# Patient Record
Sex: MALE | Race: WHITE | Employment: OTHER
[De-identification: names, ages, dates, MRNs, and addresses within clinical notes are randomized per-mention and may not be internally consistent; named-entity substitution may affect disease eponyms.]

---

## 2024-01-22 ENCOUNTER — APPOINTMENT (OUTPATIENT)
Facility: HOSPITAL | Age: 74
End: 2024-01-22
Payer: MEDICARE

## 2024-01-22 ENCOUNTER — HOSPITAL ENCOUNTER (EMERGENCY)
Facility: HOSPITAL | Age: 74
Discharge: HOME | End: 2024-01-22
Attending: STUDENT IN AN ORGANIZED HEALTH CARE EDUCATION/TRAINING PROGRAM
Payer: MEDICARE

## 2024-01-22 VITALS
SYSTOLIC BLOOD PRESSURE: 135 MMHG | BODY MASS INDEX: 28.72 KG/M2 | WEIGHT: 183 LBS | RESPIRATION RATE: 18 BRPM | OXYGEN SATURATION: 99 % | HEIGHT: 67 IN | DIASTOLIC BLOOD PRESSURE: 94 MMHG | TEMPERATURE: 99 F | HEART RATE: 66 BPM

## 2024-01-22 DIAGNOSIS — M54.41 ACUTE RIGHT-SIDED BACK PAIN WITH SCIATICA: Primary | ICD-10-CM

## 2024-01-22 LAB
ALBUMIN SERPL-MCNC: 3.7 G/DL (ref 3.5–5)
ALBUMIN/GLOB SERPL: 0.8 (ref 1.1–2.2)
ALP SERPL-CCNC: 86 U/L (ref 45–117)
ALT SERPL-CCNC: 16 U/L (ref 12–78)
ANION GAP SERPL CALC-SCNC: 4 MMOL/L (ref 5–15)
APPEARANCE UR: CLEAR
AST SERPL W P-5'-P-CCNC: 9 U/L (ref 15–37)
BACTERIA URNS QL MICRO: NEGATIVE /HPF
BASOPHILS # BLD: 0.1 K/UL (ref 0–0.1)
BASOPHILS NFR BLD: 1 % (ref 0–1)
BILIRUB SERPL-MCNC: 0.7 MG/DL (ref 0.2–1)
BILIRUB UR QL: NEGATIVE
BUN SERPL-MCNC: 14 MG/DL (ref 6–20)
BUN/CREAT SERPL: 10 (ref 12–20)
CA-I BLD-MCNC: 9.3 MG/DL (ref 8.5–10.1)
CHLORIDE SERPL-SCNC: 102 MMOL/L (ref 97–108)
CO2 SERPL-SCNC: 29 MMOL/L (ref 21–32)
COLOR UR: ABNORMAL
CREAT SERPL-MCNC: 1.35 MG/DL (ref 0.7–1.3)
DIFFERENTIAL METHOD BLD: ABNORMAL
EOSINOPHIL # BLD: 0.2 K/UL (ref 0–0.4)
EOSINOPHIL NFR BLD: 2 % (ref 0–7)
EPITH CASTS URNS QL MICRO: ABNORMAL /LPF
ERYTHROCYTE [DISTWIDTH] IN BLOOD BY AUTOMATED COUNT: 12 % (ref 11.5–14.5)
GLOBULIN SER CALC-MCNC: 4.9 G/DL (ref 2–4)
GLUCOSE SERPL-MCNC: 110 MG/DL (ref 65–100)
GLUCOSE UR STRIP.AUTO-MCNC: NEGATIVE MG/DL
HCT VFR BLD AUTO: 44.5 % (ref 36.6–50.3)
HGB BLD-MCNC: 16.1 G/DL (ref 12.1–17)
HGB UR QL STRIP: NEGATIVE
IMM GRANULOCYTES # BLD AUTO: 0 K/UL (ref 0–0.04)
IMM GRANULOCYTES NFR BLD AUTO: 0 % (ref 0–0.5)
KETONES UR QL STRIP.AUTO: NEGATIVE MG/DL
LEUKOCYTE ESTERASE UR QL STRIP.AUTO: NEGATIVE
LIPASE SERPL-CCNC: 38 U/L (ref 13–75)
LYMPHOCYTES # BLD: 1.5 K/UL (ref 0.8–3.5)
LYMPHOCYTES NFR BLD: 13 % (ref 12–49)
MCH RBC QN AUTO: 32.9 PG (ref 26–34)
MCHC RBC AUTO-ENTMCNC: 36.2 G/DL (ref 30–36.5)
MCV RBC AUTO: 91 FL (ref 80–99)
MONOCYTES # BLD: 0.8 K/UL (ref 0–1)
MONOCYTES NFR BLD: 6 % (ref 5–13)
MUCOUS THREADS URNS QL MICRO: ABNORMAL /LPF
NEUTS SEG # BLD: 9.5 K/UL (ref 1.8–8)
NEUTS SEG NFR BLD: 78 % (ref 32–75)
NITRITE UR QL STRIP.AUTO: NEGATIVE
NRBC # BLD: 0 K/UL (ref 0–0.01)
NRBC BLD-RTO: 0 PER 100 WBC
PH UR STRIP: 6 (ref 5–8)
PLATELET # BLD AUTO: 367 K/UL (ref 150–400)
PMV BLD AUTO: 9.5 FL (ref 8.9–12.9)
POTASSIUM SERPL-SCNC: 3.4 MMOL/L (ref 3.5–5.1)
PROT SERPL-MCNC: 8.6 G/DL (ref 6.4–8.2)
PROT UR STRIP-MCNC: 30 MG/DL
RBC # BLD AUTO: 4.89 M/UL (ref 4.1–5.7)
RBC #/AREA URNS HPF: ABNORMAL /HPF (ref 0–5)
SODIUM SERPL-SCNC: 135 MMOL/L (ref 136–145)
SP GR UR REFRACTOMETRY: 1.02 (ref 1–1.03)
UROBILINOGEN UR QL STRIP.AUTO: 0.1 EU/DL (ref 0.1–1)
WBC # BLD AUTO: 12.1 K/UL (ref 4.1–11.1)
WBC URNS QL MICRO: ABNORMAL /HPF (ref 0–4)

## 2024-01-22 PROCEDURE — 96374 THER/PROPH/DIAG INJ IV PUSH: CPT

## 2024-01-22 PROCEDURE — 6360000002 HC RX W HCPCS: Performed by: STUDENT IN AN ORGANIZED HEALTH CARE EDUCATION/TRAINING PROGRAM

## 2024-01-22 PROCEDURE — 85025 COMPLETE CBC W/AUTO DIFF WBC: CPT

## 2024-01-22 PROCEDURE — 83690 ASSAY OF LIPASE: CPT

## 2024-01-22 PROCEDURE — 74176 CT ABD & PELVIS W/O CONTRAST: CPT

## 2024-01-22 PROCEDURE — 36415 COLL VENOUS BLD VENIPUNCTURE: CPT

## 2024-01-22 PROCEDURE — 99284 EMERGENCY DEPT VISIT MOD MDM: CPT

## 2024-01-22 PROCEDURE — 6370000000 HC RX 637 (ALT 250 FOR IP): Performed by: STUDENT IN AN ORGANIZED HEALTH CARE EDUCATION/TRAINING PROGRAM

## 2024-01-22 PROCEDURE — 81001 URINALYSIS AUTO W/SCOPE: CPT

## 2024-01-22 PROCEDURE — 80053 COMPREHEN METABOLIC PANEL: CPT

## 2024-01-22 RX ORDER — OXYCODONE HYDROCHLORIDE AND ACETAMINOPHEN 5; 325 MG/1; MG/1
1 TABLET ORAL
Status: COMPLETED | OUTPATIENT
Start: 2024-01-22 | End: 2024-01-22

## 2024-01-22 RX ORDER — METHOCARBAMOL 750 MG/1
750 TABLET, FILM COATED ORAL 3 TIMES DAILY
Qty: 9 TABLET | Refills: 0 | Status: SHIPPED | OUTPATIENT
Start: 2024-01-22 | End: 2024-01-25

## 2024-01-22 RX ORDER — METHOCARBAMOL 750 MG/1
750 TABLET, FILM COATED ORAL 3 TIMES DAILY
Qty: 9 TABLET | Refills: 0 | Status: SHIPPED | OUTPATIENT
Start: 2024-01-22 | End: 2024-01-22

## 2024-01-22 RX ORDER — KETOROLAC TROMETHAMINE 30 MG/ML
15 INJECTION, SOLUTION INTRAMUSCULAR; INTRAVENOUS
Status: COMPLETED | OUTPATIENT
Start: 2024-01-22 | End: 2024-01-22

## 2024-01-22 RX ORDER — LIDOCAINE 50 MG/G
1 PATCH TOPICAL DAILY
Qty: 10 PATCH | Refills: 0 | Status: SHIPPED | OUTPATIENT
Start: 2024-01-22

## 2024-01-22 RX ORDER — NAPROXEN 500 MG/1
500 TABLET ORAL 2 TIMES DAILY
Qty: 14 TABLET | Refills: 0 | Status: SHIPPED | OUTPATIENT
Start: 2024-01-22 | End: 2024-01-22

## 2024-01-22 RX ORDER — NAPROXEN 500 MG/1
500 TABLET ORAL 2 TIMES DAILY
Qty: 14 TABLET | Refills: 0 | Status: SHIPPED | OUTPATIENT
Start: 2024-01-22 | End: 2024-01-29

## 2024-01-22 RX ORDER — LIDOCAINE 50 MG/G
1 PATCH TOPICAL DAILY
Qty: 10 PATCH | Refills: 0 | Status: SHIPPED | OUTPATIENT
Start: 2024-01-22 | End: 2024-01-22

## 2024-01-22 RX ADMIN — OXYCODONE AND ACETAMINOPHEN 1 TABLET: 5; 325 TABLET ORAL at 12:19

## 2024-01-22 RX ADMIN — KETOROLAC TROMETHAMINE 15 MG: 30 INJECTION INTRAMUSCULAR; INTRAVENOUS at 12:19

## 2024-01-22 ASSESSMENT — PAIN DESCRIPTION - LOCATION
LOCATION: FLANK
LOCATION: BACK

## 2024-01-22 ASSESSMENT — LIFESTYLE VARIABLES
HOW OFTEN DO YOU HAVE A DRINK CONTAINING ALCOHOL: NEVER
HOW MANY STANDARD DRINKS CONTAINING ALCOHOL DO YOU HAVE ON A TYPICAL DAY: PATIENT DOES NOT DRINK

## 2024-01-22 ASSESSMENT — PAIN - FUNCTIONAL ASSESSMENT
PAIN_FUNCTIONAL_ASSESSMENT: ACTIVITIES ARE NOT PREVENTED
PAIN_FUNCTIONAL_ASSESSMENT: NONE - DENIES PAIN

## 2024-01-22 ASSESSMENT — PAIN DESCRIPTION - ORIENTATION
ORIENTATION: RIGHT
ORIENTATION: RIGHT

## 2024-01-22 ASSESSMENT — PAIN SCALES - GENERAL
PAINLEVEL_OUTOF10: 8
PAINLEVEL_OUTOF10: 5

## 2024-01-22 ASSESSMENT — PAIN DESCRIPTION - DESCRIPTORS: DESCRIPTORS: ACHING

## 2024-01-22 NOTE — ED NOTES
Ambulated to bathroom with no assistance. Pt states he stuck his finger in the urine cup at the point of tube collection and c/o bleeding on finger. RN provided a band-aid. MD made aware. Pt states last Tetanus shot was Feb 2023

## 2024-01-23 NOTE — ED PROVIDER NOTES
[facility] EMERGENCY DEPT  EMERGENCY DEPARTMENT HISTORY AND PHYSICAL EXAM      Date: 1/22/2024  Patient Name: Chip Daugherty  MRN: [id number]  Birthdate 1950  Date of evaluation: 1/22/2024  Provider: Gurwinder Ricci MD   Note Started: 3:01 PM EST 1/23/24    HISTORY OF PRESENT ILLNESS     Chief Complaint   Patient presents with    Flank Pain       History Provided By: Patient    HPI: Chip Daugherty is a 73 y.o. male with past medical history as reviewed below presents for evaluation of back pain and flank pain.  Symptoms present for the last week.  Patient endorses right-sided flank pain.  No associated dysuria or hematuria.  No heavy lifting.  Over-the-counter analgesics used with minimal relief.  No focal motor weakness or loss of sensation.  Does have a history of kidney stones and is chronic left renal atrophy    PAST MEDICAL HISTORY   Past Medical History:  Past Medical History:   Diagnosis Date    Heart attack (HCC)     High cholesterol     Hypertension        Past Surgical History:  No past surgical history on file.    Family History:  No family history on file.    Social History:       Allergies:  No Known Allergies    PCP: Uvaldo Christianson APRN - NP    Current Meds:   No current facility-administered medications for this encounter.     Current Outpatient Medications   Medication Sig Dispense Refill    lidocaine (LIDODERM) 5 % Place 1 patch onto the skin daily 12 hours on, 12 hours off. 10 patch 0    methocarbamol (ROBAXIN-750) 750 MG tablet Take 1 tablet by mouth 3 times daily for 3 days 9 tablet 0    naproxen (NAPROSYN) 500 MG tablet Take 1 tablet by mouth 2 times daily for 7 days 14 tablet 0       Social Determinants of Health:   Social Determinants of Health     Tobacco Use: Not on file   Alcohol Use: Not At Risk (1/22/2024)    AUDIT-C     Frequency of Alcohol Consumption: Never     Average Number of Drinks: Patient does not drink     Frequency of Binge Drinking: Never   Financial Resource

## 2024-12-07 ENCOUNTER — APPOINTMENT (OUTPATIENT)
Facility: HOSPITAL | Age: 74
DRG: 061 | End: 2024-12-07
Payer: MEDICARE

## 2024-12-07 ENCOUNTER — HOSPITAL ENCOUNTER (INPATIENT)
Facility: HOSPITAL | Age: 74
LOS: 3 days | Discharge: NURSING, REHAB OR LONG-TERM CARE | DRG: 061 | End: 2024-12-10
Attending: EMERGENCY MEDICINE | Admitting: INTERNAL MEDICINE
Payer: MEDICARE

## 2024-12-07 DIAGNOSIS — R79.89 ELEVATED TROPONIN: ICD-10-CM

## 2024-12-07 DIAGNOSIS — I63.9 CEREBROVASCULAR ACCIDENT (CVA), UNSPECIFIED MECHANISM (HCC): Primary | ICD-10-CM

## 2024-12-07 DIAGNOSIS — M25.511 ACUTE PAIN OF RIGHT SHOULDER: ICD-10-CM

## 2024-12-07 DIAGNOSIS — K52.9 COLITIS: ICD-10-CM

## 2024-12-07 LAB
ALBUMIN SERPL-MCNC: 3.5 G/DL (ref 3.5–5)
ALBUMIN/GLOB SERPL: 0.9 (ref 1.1–2.2)
ALP SERPL-CCNC: 75 U/L (ref 45–117)
ALT SERPL-CCNC: 18 U/L (ref 12–78)
ANION GAP SERPL CALC-SCNC: 6 MMOL/L (ref 2–12)
ANION GAP SERPL CALC-SCNC: 7 MMOL/L (ref 2–12)
AST SERPL W P-5'-P-CCNC: ABNORMAL U/L (ref 15–37)
BASOPHILS # BLD: 0.1 K/UL (ref 0–0.1)
BASOPHILS NFR BLD: 1 % (ref 0–1)
BILIRUB SERPL-MCNC: 0.6 MG/DL (ref 0.2–1)
BUN SERPL-MCNC: 14 MG/DL (ref 6–20)
BUN SERPL-MCNC: 15 MG/DL (ref 6–20)
BUN/CREAT SERPL: 12 (ref 12–20)
BUN/CREAT SERPL: 12 (ref 12–20)
CA-I BLD-MCNC: 8.6 MG/DL (ref 8.5–10.1)
CA-I BLD-MCNC: 9 MG/DL (ref 8.5–10.1)
CHLORIDE SERPL-SCNC: 110 MMOL/L (ref 97–108)
CHLORIDE SERPL-SCNC: 111 MMOL/L (ref 97–108)
CO2 SERPL-SCNC: 24 MMOL/L (ref 21–32)
CO2 SERPL-SCNC: 24 MMOL/L (ref 21–32)
CREAT SERPL-MCNC: 1.17 MG/DL (ref 0.7–1.3)
CREAT SERPL-MCNC: 1.29 MG/DL (ref 0.7–1.3)
DIFFERENTIAL METHOD BLD: ABNORMAL
EOSINOPHIL # BLD: 0 K/UL (ref 0–0.4)
EOSINOPHIL NFR BLD: 0 % (ref 0–7)
ERYTHROCYTE [DISTWIDTH] IN BLOOD BY AUTOMATED COUNT: 12.2 % (ref 11.5–14.5)
GLOBULIN SER CALC-MCNC: 3.7 G/DL (ref 2–4)
GLUCOSE BLD STRIP.AUTO-MCNC: 118 MG/DL (ref 65–100)
GLUCOSE SERPL-MCNC: 114 MG/DL (ref 65–100)
GLUCOSE SERPL-MCNC: 131 MG/DL (ref 65–100)
HCT VFR BLD AUTO: 39 % (ref 36.6–50.3)
HGB BLD-MCNC: 13.9 G/DL (ref 12.1–17)
IMM GRANULOCYTES # BLD AUTO: 0.1 K/UL (ref 0–0.04)
IMM GRANULOCYTES NFR BLD AUTO: 0 % (ref 0–0.5)
INR PPP: 1 (ref 0.9–1.1)
LYMPHOCYTES # BLD: 0.9 K/UL (ref 0.8–3.5)
LYMPHOCYTES NFR BLD: 7 % (ref 12–49)
MCH RBC QN AUTO: 33.3 PG (ref 26–34)
MCHC RBC AUTO-ENTMCNC: 35.6 G/DL (ref 30–36.5)
MCV RBC AUTO: 93.3 FL (ref 80–99)
MONOCYTES # BLD: 0.4 K/UL (ref 0–1)
MONOCYTES NFR BLD: 3 % (ref 5–13)
MRSA DNA SPEC QL NAA+PROBE: NOT DETECTED
NEUTS SEG # BLD: 11 K/UL (ref 1.8–8)
NEUTS SEG NFR BLD: 89 % (ref 32–75)
NRBC # BLD: 0 K/UL (ref 0–0.01)
NRBC BLD-RTO: 0 PER 100 WBC
PERFORMED BY:: ABNORMAL
PLATELET # BLD AUTO: 338 K/UL (ref 150–400)
PMV BLD AUTO: 9.6 FL (ref 8.9–12.9)
POTASSIUM SERPL-SCNC: 3.6 MMOL/L (ref 3.5–5.1)
POTASSIUM SERPL-SCNC: ABNORMAL MMOL/L (ref 3.5–5.1)
PROT SERPL-MCNC: 7.2 G/DL (ref 6.4–8.2)
PROTHROMBIN TIME: 14 SEC (ref 11.9–14.6)
RBC # BLD AUTO: 4.18 M/UL (ref 4.1–5.7)
SODIUM SERPL-SCNC: 140 MMOL/L (ref 136–145)
SODIUM SERPL-SCNC: 142 MMOL/L (ref 136–145)
TROPONIN I SERPL HS-MCNC: 1301 NG/L (ref 0–76)
TROPONIN I SERPL HS-MCNC: 1560 NG/L (ref 0–76)
WBC # BLD AUTO: 12.5 K/UL (ref 4.1–11.1)

## 2024-12-07 PROCEDURE — 0042T CT BRAIN PERFUSION: CPT

## 2024-12-07 PROCEDURE — 70498 CT ANGIOGRAPHY NECK: CPT

## 2024-12-07 PROCEDURE — 71275 CT ANGIOGRAPHY CHEST: CPT

## 2024-12-07 PROCEDURE — 70450 CT HEAD/BRAIN W/O DYE: CPT

## 2024-12-07 PROCEDURE — 2000000000 HC ICU R&B

## 2024-12-07 PROCEDURE — 72125 CT NECK SPINE W/O DYE: CPT

## 2024-12-07 PROCEDURE — 2580000003 HC RX 258: Performed by: EMERGENCY MEDICINE

## 2024-12-07 PROCEDURE — 72131 CT LUMBAR SPINE W/O DYE: CPT

## 2024-12-07 PROCEDURE — 82962 GLUCOSE BLOOD TEST: CPT

## 2024-12-07 PROCEDURE — 6370000000 HC RX 637 (ALT 250 FOR IP): Performed by: NURSE PRACTITIONER

## 2024-12-07 PROCEDURE — 71045 X-RAY EXAM CHEST 1 VIEW: CPT

## 2024-12-07 PROCEDURE — 93005 ELECTROCARDIOGRAM TRACING: CPT | Performed by: EMERGENCY MEDICINE

## 2024-12-07 PROCEDURE — 96375 TX/PRO/DX INJ NEW DRUG ADDON: CPT

## 2024-12-07 PROCEDURE — 2580000003 HC RX 258: Performed by: INTERNAL MEDICINE

## 2024-12-07 PROCEDURE — 72128 CT CHEST SPINE W/O DYE: CPT

## 2024-12-07 PROCEDURE — 99285 EMERGENCY DEPT VISIT HI MDM: CPT

## 2024-12-07 PROCEDURE — 3E03317 INTRODUCTION OF OTHER THROMBOLYTIC INTO PERIPHERAL VEIN, PERCUTANEOUS APPROACH: ICD-10-PCS | Performed by: EMERGENCY MEDICINE

## 2024-12-07 PROCEDURE — 80048 BASIC METABOLIC PNL TOTAL CA: CPT

## 2024-12-07 PROCEDURE — 85025 COMPLETE CBC W/AUTO DIFF WBC: CPT

## 2024-12-07 PROCEDURE — 37195 THROMBOLYTIC THERAPY STROKE: CPT

## 2024-12-07 PROCEDURE — 2100000000 HC CCU R&B

## 2024-12-07 PROCEDURE — 6360000002 HC RX W HCPCS: Performed by: EMERGENCY MEDICINE

## 2024-12-07 PROCEDURE — 80053 COMPREHEN METABOLIC PANEL: CPT

## 2024-12-07 PROCEDURE — 4A03X5D MEASUREMENT OF ARTERIAL FLOW, INTRACRANIAL, EXTERNAL APPROACH: ICD-10-PCS | Performed by: RADIOLOGY

## 2024-12-07 PROCEDURE — 87040 BLOOD CULTURE FOR BACTERIA: CPT

## 2024-12-07 PROCEDURE — 84484 ASSAY OF TROPONIN QUANT: CPT

## 2024-12-07 PROCEDURE — 87641 MR-STAPH DNA AMP PROBE: CPT

## 2024-12-07 PROCEDURE — 85610 PROTHROMBIN TIME: CPT

## 2024-12-07 PROCEDURE — 96365 THER/PROPH/DIAG IV INF INIT: CPT

## 2024-12-07 PROCEDURE — 6360000004 HC RX CONTRAST MEDICATION: Performed by: EMERGENCY MEDICINE

## 2024-12-07 RX ORDER — SODIUM CHLORIDE 9 MG/ML
INJECTION, SOLUTION INTRAVENOUS PRN
Status: DISCONTINUED | OUTPATIENT
Start: 2024-12-07 | End: 2024-12-10 | Stop reason: HOSPADM

## 2024-12-07 RX ORDER — ACETAMINOPHEN 325 MG/1
650 TABLET ORAL EVERY 6 HOURS PRN
Status: DISCONTINUED | OUTPATIENT
Start: 2024-12-07 | End: 2024-12-07

## 2024-12-07 RX ORDER — ACETAMINOPHEN 650 MG/1
650 SUPPOSITORY RECTAL EVERY 6 HOURS PRN
Status: DISCONTINUED | OUTPATIENT
Start: 2024-12-07 | End: 2024-12-07

## 2024-12-07 RX ORDER — SODIUM CHLORIDE 0.9 % (FLUSH) 0.9 %
5-40 SYRINGE (ML) INJECTION PRN
Status: DISCONTINUED | OUTPATIENT
Start: 2024-12-07 | End: 2024-12-10 | Stop reason: HOSPADM

## 2024-12-07 RX ORDER — METRONIDAZOLE 500 MG/100ML
500 INJECTION, SOLUTION INTRAVENOUS
Status: COMPLETED | OUTPATIENT
Start: 2024-12-07 | End: 2024-12-07

## 2024-12-07 RX ORDER — MORPHINE SULFATE 4 MG/ML
4 INJECTION, SOLUTION INTRAMUSCULAR; INTRAVENOUS
Status: COMPLETED | OUTPATIENT
Start: 2024-12-07 | End: 2024-12-07

## 2024-12-07 RX ORDER — ONDANSETRON 2 MG/ML
4 INJECTION INTRAMUSCULAR; INTRAVENOUS EVERY 6 HOURS PRN
Status: DISCONTINUED | OUTPATIENT
Start: 2024-12-07 | End: 2024-12-10 | Stop reason: HOSPADM

## 2024-12-07 RX ORDER — LIDOCAINE 4 G/G
1 PATCH TOPICAL DAILY
Status: DISCONTINUED | OUTPATIENT
Start: 2024-12-07 | End: 2024-12-10 | Stop reason: HOSPADM

## 2024-12-07 RX ORDER — SODIUM CHLORIDE 0.9 % (FLUSH) 0.9 %
10 SYRINGE (ML) INJECTION ONCE
Status: COMPLETED | OUTPATIENT
Start: 2024-12-07 | End: 2024-12-07

## 2024-12-07 RX ORDER — TRAMADOL HYDROCHLORIDE 50 MG/1
50 TABLET ORAL EVERY 6 HOURS PRN
Status: ON HOLD | COMMUNITY
End: 2024-12-10

## 2024-12-07 RX ORDER — SODIUM CHLORIDE 0.9 % (FLUSH) 0.9 %
5-40 SYRINGE (ML) INJECTION EVERY 12 HOURS SCHEDULED
Status: DISCONTINUED | OUTPATIENT
Start: 2024-12-07 | End: 2024-12-10 | Stop reason: HOSPADM

## 2024-12-07 RX ORDER — POTASSIUM CHLORIDE 7.45 MG/ML
10 INJECTION INTRAVENOUS PRN
Status: DISCONTINUED | OUTPATIENT
Start: 2024-12-07 | End: 2024-12-10 | Stop reason: HOSPADM

## 2024-12-07 RX ORDER — TELMISARTAN 20 MG/1
20 TABLET ORAL DAILY
COMMUNITY

## 2024-12-07 RX ORDER — ACETAMINOPHEN 650 MG/1
650 SUPPOSITORY RECTAL EVERY 6 HOURS SCHEDULED
Status: DISCONTINUED | OUTPATIENT
Start: 2024-12-07 | End: 2024-12-10 | Stop reason: HOSPADM

## 2024-12-07 RX ORDER — HYDROCHLOROTHIAZIDE 12.5 MG/1
12.5 CAPSULE ORAL DAILY
COMMUNITY

## 2024-12-07 RX ORDER — CLOPIDOGREL BISULFATE 75 MG/1
75 TABLET ORAL DAILY
COMMUNITY

## 2024-12-07 RX ORDER — DIPHENHYDRAMINE HCL 25 MG
25 TABLET ORAL EVERY 6 HOURS PRN
Status: ON HOLD | COMMUNITY
End: 2024-12-10 | Stop reason: HOSPADM

## 2024-12-07 RX ORDER — IOPAMIDOL 755 MG/ML
100 INJECTION, SOLUTION INTRAVASCULAR
Status: COMPLETED | OUTPATIENT
Start: 2024-12-07 | End: 2024-12-07

## 2024-12-07 RX ORDER — TRAZODONE HYDROCHLORIDE 50 MG/1
50 TABLET, FILM COATED ORAL NIGHTLY
COMMUNITY

## 2024-12-07 RX ORDER — POTASSIUM CHLORIDE 29.8 MG/ML
20 INJECTION INTRAVENOUS PRN
Status: DISCONTINUED | OUTPATIENT
Start: 2024-12-07 | End: 2024-12-10 | Stop reason: HOSPADM

## 2024-12-07 RX ORDER — MAGNESIUM SULFATE IN WATER 40 MG/ML
2000 INJECTION, SOLUTION INTRAVENOUS PRN
Status: DISCONTINUED | OUTPATIENT
Start: 2024-12-07 | End: 2024-12-10 | Stop reason: HOSPADM

## 2024-12-07 RX ORDER — METOPROLOL SUCCINATE 25 MG/1
25 TABLET, EXTENDED RELEASE ORAL DAILY
COMMUNITY

## 2024-12-07 RX ORDER — ROSUVASTATIN CALCIUM 10 MG/1
10 TABLET, COATED ORAL DAILY
Status: ON HOLD | COMMUNITY
End: 2024-12-10

## 2024-12-07 RX ORDER — ENOXAPARIN SODIUM 100 MG/ML
40 INJECTION SUBCUTANEOUS DAILY
Status: DISCONTINUED | OUTPATIENT
Start: 2024-12-07 | End: 2024-12-07

## 2024-12-07 RX ORDER — ACETAMINOPHEN 325 MG/1
650 TABLET ORAL EVERY 6 HOURS SCHEDULED
Status: DISCONTINUED | OUTPATIENT
Start: 2024-12-07 | End: 2024-12-10 | Stop reason: HOSPADM

## 2024-12-07 RX ADMIN — TENECTEPLASE 21 MG: KIT at 12:48

## 2024-12-07 RX ADMIN — Medication 10 ML: at 12:48

## 2024-12-07 RX ADMIN — ACETAMINOPHEN 650 MG: 650 SUPPOSITORY RECTAL at 20:48

## 2024-12-07 RX ADMIN — SODIUM CHLORIDE, PRESERVATIVE FREE 10 ML: 5 INJECTION INTRAVENOUS at 20:55

## 2024-12-07 RX ADMIN — SODIUM CHLORIDE, PRESERVATIVE FREE 10 ML: 5 INJECTION INTRAVENOUS at 20:59

## 2024-12-07 RX ADMIN — PIPERACILLIN AND TAZOBACTAM 4500 MG: 4; .5 INJECTION, POWDER, FOR SOLUTION INTRAVENOUS at 15:27

## 2024-12-07 RX ADMIN — SODIUM CHLORIDE, PRESERVATIVE FREE 10 ML: 5 INJECTION INTRAVENOUS at 20:50

## 2024-12-07 RX ADMIN — IOPAMIDOL 100 ML: 755 INJECTION, SOLUTION INTRAVENOUS at 14:27

## 2024-12-07 RX ADMIN — IOPAMIDOL 100 ML: 755 INJECTION, SOLUTION INTRAVENOUS at 12:13

## 2024-12-07 RX ADMIN — METRONIDAZOLE 500 MG: 500 INJECTION, SOLUTION INTRAVENOUS at 16:42

## 2024-12-07 RX ADMIN — MORPHINE SULFATE 4 MG: 4 INJECTION, SOLUTION INTRAMUSCULAR; INTRAVENOUS at 13:24

## 2024-12-07 ASSESSMENT — PAIN SCALES - GENERAL
PAINLEVEL_OUTOF10: 0
PAINLEVEL_OUTOF10: 8
PAINLEVEL_OUTOF10: 0

## 2024-12-07 ASSESSMENT — PAIN - FUNCTIONAL ASSESSMENT
PAIN_FUNCTIONAL_ASSESSMENT: NONE - DENIES PAIN

## 2024-12-07 ASSESSMENT — PAIN DESCRIPTION - LOCATION: LOCATION: LEG

## 2024-12-07 ASSESSMENT — LIFESTYLE VARIABLES
HOW MANY STANDARD DRINKS CONTAINING ALCOHOL DO YOU HAVE ON A TYPICAL DAY: PATIENT DOES NOT DRINK
HOW OFTEN DO YOU HAVE A DRINK CONTAINING ALCOHOL: NEVER

## 2024-12-07 NOTE — ED NOTES
Spastic movements keep occurring in right leg, remains stiff at this time, has some movement occasionally.

## 2024-12-07 NOTE — ED TRIAGE NOTES
Rt sided weakness, wife saw him last night normal. Patient stated when he got us this AM sun was up, unknown time, he went into bathroom when his legs gave out on him. Right arm deficit present. Bilateral leg stiffness, bilateral leg weakness,  right sided facial drop, slurred speech for ems  but upon ED arrival patient is mute, delayed physical responses.     Per family LKWT possible around 0900am but still remains unknown.       Glucose 174    Concern for spinal injury after fall, ccollar placed, additional scans placed.     TNK delayed due to UNKNOWN LKWT.

## 2024-12-07 NOTE — ED PROVIDER NOTES
[facility] [place]PT  EMERGENCY DEPARTMENT HISTORY AND PHYSICAL EXAM      Date: 12/7/2024  Patient Name: Chip Daugherty  MRN: [id number]  Birthdate 1950  Date of evaluation: 12/7/2024  Provider: Fanny Kent MD   Note Started: 3:07 PM EST 12/7/24    HISTORY OF PRESENT ILLNESS     Chief Complaint   Patient presents with    Cerebrovascular Accident       History Provided By: Patient    HPI: Chip Daugherty is a 74 y.o. male patient presents as a level 1 stroke alert last known well was 2 hours ago when he walked to the bathroom then developed dysarthria right arm weakness and lower extremity weakness.  Had a controlled fall to the ground.  Not traumatic no head injury.    PAST MEDICAL HISTORY   Past Medical History:  Past Medical History:   Diagnosis Date    Heart attack (HCC)     High cholesterol     Hypertension        Past Surgical History:  History reviewed. No pertinent surgical history.    Family History:  History reviewed. No pertinent family history.    Social History:       Allergies:  No Known Allergies    PCP: Uvaldo Christianson APRN - NP    Current Meds:   Current Facility-Administered Medications   Medication Dose Route Frequency Provider Last Rate Last Admin    TENECTEplase (TNKASE) 50 MG injection             sodium chloride flush 0.9 % injection 5-40 mL  5-40 mL IntraVENous 2 times per day Fanny Kent MD        sodium chloride flush 0.9 % injection 5-40 mL  5-40 mL IntraVENous PRN Fanny Kent MD        0.9 % sodium chloride infusion   IntraVENous PRN Fanny Kent MD        dextrose bolus 10% 125 mL  125 mL IntraVENous PRN Fanny Kent MD        ondansetron (ZOFRAN) injection 4 mg  4 mg IntraVENous Q6H PRN Fanny Kent MD        piperacillin-tazobactam (ZOSYN) 4,500 mg in sodium chloride 0.9 % 100 mL IVPB (mini-bag)  4,500 mg IntraVENous NOW Fanny Kent MD        metroNIDAZOLE (FLAGYL) 500 mg in 0.9% NaCl 100 mL IVPB premix  500 mg IntraVENous NOW

## 2024-12-08 ENCOUNTER — APPOINTMENT (OUTPATIENT)
Facility: HOSPITAL | Age: 74
DRG: 061 | End: 2024-12-08
Payer: MEDICARE

## 2024-12-08 ENCOUNTER — APPOINTMENT (OUTPATIENT)
Facility: HOSPITAL | Age: 74
DRG: 061 | End: 2024-12-08
Attending: INTERNAL MEDICINE
Payer: MEDICARE

## 2024-12-08 LAB
ANION GAP SERPL CALC-SCNC: 8 MMOL/L (ref 2–12)
BUN SERPL-MCNC: 14 MG/DL (ref 6–20)
BUN/CREAT SERPL: 13 (ref 12–20)
CA-I BLD-MCNC: 8.9 MG/DL (ref 8.5–10.1)
CHLORIDE SERPL-SCNC: 112 MMOL/L (ref 97–108)
CHOLEST SERPL-MCNC: 130 MG/DL
CO2 SERPL-SCNC: 23 MMOL/L (ref 21–32)
CREAT SERPL-MCNC: 1.11 MG/DL (ref 0.7–1.3)
EKG ATRIAL RATE: 95 BPM
EKG DIAGNOSIS: NORMAL
EKG P AXIS: 50 DEGREES
EKG P-R INTERVAL: 198 MS
EKG Q-T INTERVAL: 394 MS
EKG QRS DURATION: 104 MS
EKG QTC CALCULATION (BAZETT): 495 MS
EKG R AXIS: 0 DEGREES
EKG T AXIS: 38 DEGREES
EKG VENTRICULAR RATE: 95 BPM
ERYTHROCYTE [DISTWIDTH] IN BLOOD BY AUTOMATED COUNT: 12.5 % (ref 11.5–14.5)
EST. AVERAGE GLUCOSE BLD GHB EST-MCNC: 111 MG/DL
GLUCOSE BLD STRIP.AUTO-MCNC: 104 MG/DL (ref 65–100)
GLUCOSE SERPL-MCNC: 109 MG/DL (ref 65–100)
HBA1C MFR BLD: 5.5 % (ref 4–5.6)
HCT VFR BLD AUTO: 39.1 % (ref 36.6–50.3)
HDLC SERPL-MCNC: 38 MG/DL
HDLC SERPL: 3.4 (ref 0–5)
HGB BLD-MCNC: 13.6 G/DL (ref 12.1–17)
LDLC SERPL CALC-MCNC: 74.2 MG/DL (ref 0–100)
LIPID PANEL: NORMAL
MCH RBC QN AUTO: 32.7 PG (ref 26–34)
MCHC RBC AUTO-ENTMCNC: 34.8 G/DL (ref 30–36.5)
MCV RBC AUTO: 94 FL (ref 80–99)
NRBC # BLD: 0 K/UL (ref 0–0.01)
NRBC BLD-RTO: 0 PER 100 WBC
PERFORMED BY:: ABNORMAL
PLATELET # BLD AUTO: 349 K/UL (ref 150–400)
PMV BLD AUTO: 9.8 FL (ref 8.9–12.9)
POTASSIUM SERPL-SCNC: 3.7 MMOL/L (ref 3.5–5.1)
RBC # BLD AUTO: 4.16 M/UL (ref 4.1–5.7)
SODIUM SERPL-SCNC: 143 MMOL/L (ref 136–145)
TRIGL SERPL-MCNC: 89 MG/DL
VLDLC SERPL CALC-MCNC: 17.8 MG/DL
WBC # BLD AUTO: 12.7 K/UL (ref 4.1–11.1)

## 2024-12-08 PROCEDURE — 2100000000 HC CCU R&B

## 2024-12-08 PROCEDURE — 70450 CT HEAD/BRAIN W/O DYE: CPT

## 2024-12-08 PROCEDURE — 99223 1ST HOSP IP/OBS HIGH 75: CPT | Performed by: PSYCHIATRY & NEUROLOGY

## 2024-12-08 PROCEDURE — 36415 COLL VENOUS BLD VENIPUNCTURE: CPT

## 2024-12-08 PROCEDURE — 83036 HEMOGLOBIN GLYCOSYLATED A1C: CPT

## 2024-12-08 PROCEDURE — 92610 EVALUATE SWALLOWING FUNCTION: CPT

## 2024-12-08 PROCEDURE — 2580000003 HC RX 258: Performed by: INTERNAL MEDICINE

## 2024-12-08 PROCEDURE — 2580000003 HC RX 258: Performed by: EMERGENCY MEDICINE

## 2024-12-08 PROCEDURE — 80061 LIPID PANEL: CPT

## 2024-12-08 PROCEDURE — 70551 MRI BRAIN STEM W/O DYE: CPT

## 2024-12-08 PROCEDURE — 93308 TTE F-UP OR LMTD: CPT

## 2024-12-08 PROCEDURE — 82962 GLUCOSE BLOOD TEST: CPT

## 2024-12-08 PROCEDURE — 80048 BASIC METABOLIC PNL TOTAL CA: CPT

## 2024-12-08 PROCEDURE — 2000000000 HC ICU R&B

## 2024-12-08 PROCEDURE — 85027 COMPLETE CBC AUTOMATED: CPT

## 2024-12-08 PROCEDURE — 6370000000 HC RX 637 (ALT 250 FOR IP): Performed by: NURSE PRACTITIONER

## 2024-12-08 RX ORDER — TRAZODONE HYDROCHLORIDE 50 MG/1
50 TABLET, FILM COATED ORAL NIGHTLY
Status: DISCONTINUED | OUTPATIENT
Start: 2024-12-08 | End: 2024-12-10 | Stop reason: HOSPADM

## 2024-12-08 RX ORDER — TRAMADOL HYDROCHLORIDE 50 MG/1
50 TABLET ORAL EVERY 6 HOURS PRN
Status: DISCONTINUED | OUTPATIENT
Start: 2024-12-08 | End: 2024-12-10 | Stop reason: HOSPADM

## 2024-12-08 RX ORDER — ENOXAPARIN SODIUM 100 MG/ML
40 INJECTION SUBCUTANEOUS DAILY
Status: DISCONTINUED | OUTPATIENT
Start: 2024-12-09 | End: 2024-12-10 | Stop reason: HOSPADM

## 2024-12-08 RX ORDER — ATORVASTATIN CALCIUM 20 MG/1
10 TABLET, FILM COATED ORAL NIGHTLY
Status: DISCONTINUED | OUTPATIENT
Start: 2024-12-08 | End: 2024-12-10 | Stop reason: HOSPADM

## 2024-12-08 RX ORDER — CLOPIDOGREL BISULFATE 75 MG/1
75 TABLET ORAL DAILY
Status: DISCONTINUED | OUTPATIENT
Start: 2024-12-09 | End: 2024-12-10 | Stop reason: HOSPADM

## 2024-12-08 RX ORDER — BISACODYL 10 MG
10 SUPPOSITORY, RECTAL RECTAL DAILY PRN
Status: DISCONTINUED | OUTPATIENT
Start: 2024-12-08 | End: 2024-12-10 | Stop reason: HOSPADM

## 2024-12-08 RX ADMIN — TRAZODONE HYDROCHLORIDE 50 MG: 50 TABLET ORAL at 21:16

## 2024-12-08 RX ADMIN — ACETAMINOPHEN 650 MG: 325 TABLET ORAL at 14:33

## 2024-12-08 RX ADMIN — SODIUM CHLORIDE, PRESERVATIVE FREE 10 ML: 5 INJECTION INTRAVENOUS at 20:01

## 2024-12-08 RX ADMIN — ACETAMINOPHEN 650 MG: 325 TABLET ORAL at 05:42

## 2024-12-08 RX ADMIN — TRAMADOL HYDROCHLORIDE 50 MG: 50 TABLET, COATED ORAL at 21:16

## 2024-12-08 RX ADMIN — SODIUM CHLORIDE, PRESERVATIVE FREE 10 ML: 5 INJECTION INTRAVENOUS at 09:31

## 2024-12-08 RX ADMIN — ACETAMINOPHEN 650 MG: 325 TABLET ORAL at 18:36

## 2024-12-08 RX ADMIN — SODIUM CHLORIDE, PRESERVATIVE FREE 10 ML: 5 INJECTION INTRAVENOUS at 20:02

## 2024-12-08 ASSESSMENT — PAIN SCALES - GENERAL
PAINLEVEL_OUTOF10: 0
PAINLEVEL_OUTOF10: 0
PAINLEVEL_OUTOF10: 6
PAINLEVEL_OUTOF10: 0
PAINLEVEL_OUTOF10: 6
PAINLEVEL_OUTOF10: 6
PAINLEVEL_OUTOF10: 0

## 2024-12-08 ASSESSMENT — PAIN - FUNCTIONAL ASSESSMENT
PAIN_FUNCTIONAL_ASSESSMENT: PREVENTS OR INTERFERES WITH MANY ACTIVE NOT PASSIVE ACTIVITIES
PAIN_FUNCTIONAL_ASSESSMENT: PREVENTS OR INTERFERES WITH MANY ACTIVE NOT PASSIVE ACTIVITIES
PAIN_FUNCTIONAL_ASSESSMENT: PREVENTS OR INTERFERES SOME ACTIVE ACTIVITIES AND ADLS

## 2024-12-08 ASSESSMENT — PAIN DESCRIPTION - LOCATION
LOCATION: SHOULDER

## 2024-12-08 ASSESSMENT — PAIN DESCRIPTION - ORIENTATION
ORIENTATION: RIGHT

## 2024-12-08 ASSESSMENT — PAIN DESCRIPTION - DESCRIPTORS
DESCRIPTORS: ACHING
DESCRIPTORS: ACHING;THROBBING
DESCRIPTORS: ACHING;THROBBING

## 2024-12-08 ASSESSMENT — PAIN DESCRIPTION - PAIN TYPE: TYPE: OTHER (COMMENT)

## 2024-12-08 NOTE — CARE COORDINATION
12/08/24 1602   Service Assessment   Patient Orientation Alert and Oriented   Cognition Alert   History Provided By Patient;Spouse   Primary Caregiver Self   Support Systems Spouse/Significant Other   Patient's Healthcare Decision Maker is: Legal Next of Kin   PCP Verified by CM Yes   Last Visit to PCP Within last 3 months   Prior Functional Level Independent in ADLs/IADLs   Current Functional Level Independent in ADLs/IADLs   Can patient return to prior living arrangement Yes   Ability to make needs known: Good   Family able to assist with home care needs: Yes   Would you like for me to discuss the discharge plan with any other family members/significant others, and if so, who? Yes   Financial Resources Medicare;Other (Comment)  ([place])   Community Resources None   Social/Functional History   Lives With Spouse   Type of Home House   Home Equipment None   Receives Help From Family   Prior Level of Assist for ADLs Independent   Prior Level of Assist for Homemaking Independent   Occupation Retired     Demographics confirmed by the spouse.  Last seen at listed PCP x 1 month ago. Follows w/Dr. Trevino (cardiology) on an outpatient basis. Utilizes [facility] ([place]).  Prior to inpatient admission, spouse reports patient was independent w/all ADL's & IADL's.  No home O2 or DME.  No recent falls per the spouse.  Discharge disposition TBD after therapy eval and recommendation.        Advance Care Planning     General Advance Care Planning (ACP) Conversation    Date of Conversation: 12/8/2024  Conducted with: Patient with Decision Making Capacity  Other persons present: Spouse      Healthcare Decision Maker: No healthcare decision makers have been documented.       Content/Action Overview:  Reviewed DNR/DNI and patient elects Full Code (Attempt Resuscitation)        Length of Voluntary ACP Conversation in minutes:  <16 minutes (Non-Billable)           YARELY Valle

## 2024-12-08 NOTE — CONSULTS
[facility] NEUROLOGY CONSULTATION          Chief Complaint/Admission Diagnosis: Colitis [K52.9]  Elevated troponin [R79.89]  Stroke determined by clinical assessment ([facility]) [I63.9]  Cerebrovascular accident (CVA), unspecified mechanism (HCC) [I63.9]     I have been asked to see this 74 y.o. male in neurological consultation by Gordon Ruiz  to render advice and opinion regarding stroke.     ?     Impression/Recommendations:   Chip Daugherty is a 74 year old man with h/o MI in 2013 s/p stent, HTN, hyperlipidemia who was found in the bathroom floor by his wife unable to speak or move right arm and leg. He was evaluated by the teleneurologist in the ED and diagnosed with an acute ischemic stroke. He received TNK at ~1PM.   Exam shows decreased finger tapping in the right hand and right leg monoplegia. No sensory deficits noted on either side. CT head is negative for any acute change. CTA neck did not show any stenosis in the bilateral carotid arteries. EKG shows NSR      DAPT for 30 days (Aspirin 325 mg qd plus Plavix 75 mg qd) then switch to Plavix 75 mg qd after 24 hours after TNK.  Atorvastatin 80 mg qd after 24 hours of TNK.  Q4H neuro checks.  Cardiac telemetry  Permissive hypertension. Don't treat HTN until SBP more than 180.  PT/OT recommended  NPO until see by bedside swallowing or SLP evaluation.  MRI brain stroke protocol pending  Echocardiogram complete with bubble study.          Molly Weinstein MD  Teleneurologist    HPI:   History was obtained from a review of the electronic record and from the patient and family.??   Chip Daugherty is a 74 year old man with h/o MI in 2013 s/p stent, HTN, hyperlipidemia who was found on the ground by his wife unable to speak or move R extremities. He was evaluated by the teleneurologist in the ED and diagnosed with an acute ischemic stroke. He received TNK at ~1PM. There was transient improvement in his aphasia but then regression. Repeat CT

## 2024-12-08 NOTE — H&P
ill patient's bedside actively involved in patient care as well as the coordination of care.  This does not include any procedural time which has been billed separately.    Heron Novak MD  Critical Care Medicine  [facility] [place]

## 2024-12-09 ENCOUNTER — TELEPHONE (OUTPATIENT)
Facility: HOSPITAL | Age: 74
End: 2024-12-09

## 2024-12-09 LAB
ANION GAP SERPL CALC-SCNC: 8 MMOL/L (ref 2–12)
BUN SERPL-MCNC: 14 MG/DL (ref 6–20)
BUN/CREAT SERPL: 14 (ref 12–20)
CA-I BLD-MCNC: 8.5 MG/DL (ref 8.5–10.1)
CHLORIDE SERPL-SCNC: 109 MMOL/L (ref 97–108)
CO2 SERPL-SCNC: 24 MMOL/L (ref 21–32)
CREAT SERPL-MCNC: 1 MG/DL (ref 0.7–1.3)
ECHO AO ROOT DIAM: 3.2 CM
ECHO AO ROOT INDEX: 1.61 CM/M2
ECHO BSA: 2.03 M2
ECHO LA DIAMETER INDEX: 1.86 CM/M2
ECHO LA DIAMETER: 3.7 CM
ECHO LA TO AORTIC ROOT RATIO: 1.16
ECHO LV EDV A4C: 120 ML
ECHO LV EDV INDEX A4C: 60 ML/M2
ECHO LV EF PHYSICIAN: 55 %
ECHO LV EJECTION FRACTION A4C: 58 %
ECHO LV ESV A4C: 51 ML
ECHO LV ESV INDEX A4C: 26 ML/M2
ECHO LV FRACTIONAL SHORTENING: 35 % (ref 28–44)
ECHO LV INTERNAL DIMENSION DIASTOLE INDEX: 2.61 CM/M2
ECHO LV INTERNAL DIMENSION DIASTOLIC: 5.2 CM (ref 4.2–5.9)
ECHO LV INTERNAL DIMENSION SYSTOLIC INDEX: 1.71 CM/M2
ECHO LV INTERNAL DIMENSION SYSTOLIC: 3.4 CM
ECHO LV IVSD: 0.9 CM (ref 0.6–1)
ECHO LV MASS 2D: 169 G (ref 88–224)
ECHO LV MASS INDEX 2D: 84.9 G/M2 (ref 49–115)
ECHO LV POSTERIOR WALL DIASTOLIC: 0.9 CM (ref 0.6–1)
ECHO LV RELATIVE WALL THICKNESS RATIO: 0.35
ECHO LVOT AREA: 2.8 CM2
ECHO LVOT DIAM: 1.9 CM
ERYTHROCYTE [DISTWIDTH] IN BLOOD BY AUTOMATED COUNT: 12 % (ref 11.5–14.5)
GLUCOSE BLD STRIP.AUTO-MCNC: 135 MG/DL (ref 65–100)
GLUCOSE BLD STRIP.AUTO-MCNC: 185 MG/DL (ref 65–100)
GLUCOSE SERPL-MCNC: 111 MG/DL (ref 65–100)
HCT VFR BLD AUTO: 33.9 % (ref 36.6–50.3)
HGB BLD-MCNC: 11.7 G/DL (ref 12.1–17)
MCH RBC QN AUTO: 32.5 PG (ref 26–34)
MCHC RBC AUTO-ENTMCNC: 34.5 G/DL (ref 30–36.5)
MCV RBC AUTO: 94.2 FL (ref 80–99)
NRBC # BLD: 0 K/UL (ref 0–0.01)
NRBC BLD-RTO: 0 PER 100 WBC
PERFORMED BY:: ABNORMAL
PERFORMED BY:: ABNORMAL
PLATELET # BLD AUTO: 290 K/UL (ref 150–400)
PMV BLD AUTO: 9.7 FL (ref 8.9–12.9)
POTASSIUM SERPL-SCNC: 3.6 MMOL/L (ref 3.5–5.1)
RBC # BLD AUTO: 3.6 M/UL (ref 4.1–5.7)
SODIUM SERPL-SCNC: 141 MMOL/L (ref 136–145)
WBC # BLD AUTO: 10.6 K/UL (ref 4.1–11.1)

## 2024-12-09 PROCEDURE — 6370000000 HC RX 637 (ALT 250 FOR IP): Performed by: PSYCHIATRY & NEUROLOGY

## 2024-12-09 PROCEDURE — 97161 PT EVAL LOW COMPLEX 20 MIN: CPT

## 2024-12-09 PROCEDURE — 85027 COMPLETE CBC AUTOMATED: CPT

## 2024-12-09 PROCEDURE — 97530 THERAPEUTIC ACTIVITIES: CPT

## 2024-12-09 PROCEDURE — 6370000000 HC RX 637 (ALT 250 FOR IP): Performed by: NURSE PRACTITIONER

## 2024-12-09 PROCEDURE — 2580000003 HC RX 258: Performed by: EMERGENCY MEDICINE

## 2024-12-09 PROCEDURE — 6370000000 HC RX 637 (ALT 250 FOR IP): Performed by: INTERNAL MEDICINE

## 2024-12-09 PROCEDURE — 82962 GLUCOSE BLOOD TEST: CPT

## 2024-12-09 PROCEDURE — 2580000003 HC RX 258: Performed by: INTERNAL MEDICINE

## 2024-12-09 PROCEDURE — 36415 COLL VENOUS BLD VENIPUNCTURE: CPT

## 2024-12-09 PROCEDURE — 97165 OT EVAL LOW COMPLEX 30 MIN: CPT

## 2024-12-09 PROCEDURE — 6360000002 HC RX W HCPCS: Performed by: INTERNAL MEDICINE

## 2024-12-09 PROCEDURE — 80048 BASIC METABOLIC PNL TOTAL CA: CPT

## 2024-12-09 PROCEDURE — 99233 SBSQ HOSP IP/OBS HIGH 50: CPT | Performed by: PSYCHIATRY & NEUROLOGY

## 2024-12-09 PROCEDURE — 1100000000 HC RM PRIVATE

## 2024-12-09 RX ORDER — BACLOFEN 5 MG/1
5 TABLET ORAL 3 TIMES DAILY
Status: DISCONTINUED | OUTPATIENT
Start: 2024-12-09 | End: 2024-12-10 | Stop reason: HOSPADM

## 2024-12-09 RX ORDER — ASPIRIN 81 MG/1
81 TABLET, CHEWABLE ORAL DAILY
Status: DISCONTINUED | OUTPATIENT
Start: 2024-12-09 | End: 2024-12-10 | Stop reason: HOSPADM

## 2024-12-09 RX ADMIN — ACETAMINOPHEN 650 MG: 325 TABLET ORAL at 18:13

## 2024-12-09 RX ADMIN — SODIUM CHLORIDE, PRESERVATIVE FREE 10 ML: 5 INJECTION INTRAVENOUS at 22:10

## 2024-12-09 RX ADMIN — TRAMADOL HYDROCHLORIDE 50 MG: 50 TABLET, COATED ORAL at 04:56

## 2024-12-09 RX ADMIN — ATORVASTATIN CALCIUM 10 MG: 20 TABLET, FILM COATED ORAL at 22:06

## 2024-12-09 RX ADMIN — CLOPIDOGREL BISULFATE 75 MG: 75 TABLET ORAL at 09:05

## 2024-12-09 RX ADMIN — BACLOFEN 5 MG: 5 TABLET ORAL at 14:37

## 2024-12-09 RX ADMIN — TRAMADOL HYDROCHLORIDE 50 MG: 50 TABLET, COATED ORAL at 22:06

## 2024-12-09 RX ADMIN — SODIUM CHLORIDE, PRESERVATIVE FREE 10 ML: 5 INJECTION INTRAVENOUS at 08:10

## 2024-12-09 RX ADMIN — SODIUM CHLORIDE, PRESERVATIVE FREE 10 ML: 5 INJECTION INTRAVENOUS at 08:11

## 2024-12-09 RX ADMIN — ATORVASTATIN CALCIUM 10 MG: 20 TABLET, FILM COATED ORAL at 04:55

## 2024-12-09 RX ADMIN — ACETAMINOPHEN 650 MG: 325 TABLET ORAL at 14:37

## 2024-12-09 RX ADMIN — ENOXAPARIN SODIUM 40 MG: 100 INJECTION SUBCUTANEOUS at 09:06

## 2024-12-09 RX ADMIN — TRAZODONE HYDROCHLORIDE 50 MG: 50 TABLET ORAL at 22:06

## 2024-12-09 RX ADMIN — BACLOFEN 5 MG: 5 TABLET ORAL at 22:06

## 2024-12-09 RX ADMIN — ASPIRIN 81 MG: 81 TABLET, CHEWABLE ORAL at 09:05

## 2024-12-09 ASSESSMENT — PAIN SCALES - GENERAL
PAINLEVEL_OUTOF10: 7
PAINLEVEL_OUTOF10: 0
PAINLEVEL_OUTOF10: 0
PAINLEVEL_OUTOF10: 4
PAINLEVEL_OUTOF10: 0
PAINLEVEL_OUTOF10: 4
PAINLEVEL_OUTOF10: 0
PAINLEVEL_OUTOF10: 0
PAINLEVEL_OUTOF10: 6

## 2024-12-09 ASSESSMENT — PAIN DESCRIPTION - DESCRIPTORS
DESCRIPTORS: ACHING
DESCRIPTORS: ACHING
DESCRIPTORS: ACHING;THROBBING

## 2024-12-09 ASSESSMENT — PAIN DESCRIPTION - LOCATION
LOCATION: GENERALIZED
LOCATION: ABDOMEN;SHOULDER
LOCATION: SHOULDER

## 2024-12-09 ASSESSMENT — PAIN - FUNCTIONAL ASSESSMENT
PAIN_FUNCTIONAL_ASSESSMENT: PREVENTS OR INTERFERES WITH MANY ACTIVE NOT PASSIVE ACTIVITIES
PAIN_FUNCTIONAL_ASSESSMENT: PREVENTS OR INTERFERES SOME ACTIVE ACTIVITIES AND ADLS
PAIN_FUNCTIONAL_ASSESSMENT: PREVENTS OR INTERFERES SOME ACTIVE ACTIVITIES AND ADLS

## 2024-12-09 ASSESSMENT — PAIN DESCRIPTION - ORIENTATION
ORIENTATION: RIGHT
ORIENTATION: RIGHT;LEFT;MID;POSTERIOR
ORIENTATION: LOWER

## 2024-12-09 ASSESSMENT — PAIN SCALES - WONG BAKER: WONGBAKER_NUMERICALRESPONSE: NO HURT

## 2024-12-09 NOTE — TELEPHONE ENCOUNTER
Pt needs a hospital follow up appointment   Provider: Clinic Neurologist  In person   When: within 4-6 weeks  Diagnosis/reason for follow up:  Stroke    Molly Weinstein MD  Teleneurologist

## 2024-12-09 NOTE — CARE COORDINATION
CM reviewed Pt medicals, Pt lives with his wife and was IND with ADL.     Will need PT/OT eval/recommendations.     Per IDR  Pt will transfer to the floor.

## 2024-12-10 VITALS
BODY MASS INDEX: 30.07 KG/M2 | RESPIRATION RATE: 17 BRPM | DIASTOLIC BLOOD PRESSURE: 100 MMHG | SYSTOLIC BLOOD PRESSURE: 127 MMHG | HEIGHT: 67 IN | TEMPERATURE: 99 F | WEIGHT: 191.58 LBS | OXYGEN SATURATION: 96 % | HEART RATE: 109 BPM

## 2024-12-10 LAB
ANION GAP SERPL CALC-SCNC: 5 MMOL/L (ref 2–12)
BUN SERPL-MCNC: 12 MG/DL (ref 6–20)
BUN/CREAT SERPL: 12 (ref 12–20)
CA-I BLD-MCNC: 9.2 MG/DL (ref 8.5–10.1)
CHLORIDE SERPL-SCNC: 108 MMOL/L (ref 97–108)
CO2 SERPL-SCNC: 24 MMOL/L (ref 21–32)
CREAT SERPL-MCNC: 1 MG/DL (ref 0.7–1.3)
ERYTHROCYTE [DISTWIDTH] IN BLOOD BY AUTOMATED COUNT: 12 % (ref 11.5–14.5)
GLUCOSE BLD STRIP.AUTO-MCNC: 112 MG/DL (ref 65–100)
GLUCOSE SERPL-MCNC: 115 MG/DL (ref 65–100)
HCT VFR BLD AUTO: 41.7 % (ref 36.6–50.3)
HGB BLD-MCNC: 14.9 G/DL (ref 12.1–17)
MCH RBC QN AUTO: 32.8 PG (ref 26–34)
MCHC RBC AUTO-ENTMCNC: 35.7 G/DL (ref 30–36.5)
MCV RBC AUTO: 91.9 FL (ref 80–99)
NRBC # BLD: 0 K/UL (ref 0–0.01)
NRBC BLD-RTO: 0 PER 100 WBC
PERFORMED BY:: ABNORMAL
PLATELET # BLD AUTO: 368 K/UL (ref 150–400)
PMV BLD AUTO: 9.8 FL (ref 8.9–12.9)
POTASSIUM SERPL-SCNC: 3.4 MMOL/L (ref 3.5–5.1)
RBC # BLD AUTO: 4.54 M/UL (ref 4.1–5.7)
SODIUM SERPL-SCNC: 137 MMOL/L (ref 136–145)
WBC # BLD AUTO: 12.6 K/UL (ref 4.1–11.1)

## 2024-12-10 PROCEDURE — 6370000000 HC RX 637 (ALT 250 FOR IP): Performed by: NURSE PRACTITIONER

## 2024-12-10 PROCEDURE — 2580000003 HC RX 258: Performed by: INTERNAL MEDICINE

## 2024-12-10 PROCEDURE — 97530 THERAPEUTIC ACTIVITIES: CPT

## 2024-12-10 PROCEDURE — 36415 COLL VENOUS BLD VENIPUNCTURE: CPT

## 2024-12-10 PROCEDURE — 82962 GLUCOSE BLOOD TEST: CPT

## 2024-12-10 PROCEDURE — 6360000002 HC RX W HCPCS: Performed by: INTERNAL MEDICINE

## 2024-12-10 PROCEDURE — 85027 COMPLETE CBC AUTOMATED: CPT

## 2024-12-10 PROCEDURE — 6370000000 HC RX 637 (ALT 250 FOR IP): Performed by: PSYCHIATRY & NEUROLOGY

## 2024-12-10 PROCEDURE — 6370000000 HC RX 637 (ALT 250 FOR IP): Performed by: INTERNAL MEDICINE

## 2024-12-10 PROCEDURE — 80048 BASIC METABOLIC PNL TOTAL CA: CPT

## 2024-12-10 RX ORDER — BACLOFEN 5 MG/1
5 TABLET ORAL 3 TIMES DAILY PRN
Qty: 30 TABLET | Refills: 2 | Status: SHIPPED | OUTPATIENT
Start: 2024-12-10

## 2024-12-10 RX ORDER — LIDOCAINE 50 MG/G
1 PATCH TOPICAL DAILY
Qty: 10 PATCH | Refills: 0 | Status: SHIPPED | OUTPATIENT
Start: 2024-12-10

## 2024-12-10 RX ORDER — ASPIRIN 81 MG/1
81 TABLET, CHEWABLE ORAL DAILY
Qty: 30 TABLET | Refills: 0 | Status: SHIPPED | OUTPATIENT
Start: 2024-12-11 | End: 2025-01-10

## 2024-12-10 RX ORDER — ROSUVASTATIN CALCIUM 10 MG/1
20 TABLET, COATED ORAL DAILY
Qty: 30 TABLET | Refills: 3 | Status: SHIPPED | OUTPATIENT
Start: 2024-12-10

## 2024-12-10 RX ORDER — TRAMADOL HYDROCHLORIDE 50 MG/1
50 TABLET ORAL EVERY 8 HOURS PRN
Qty: 20 TABLET | Refills: 0 | Status: SHIPPED | OUTPATIENT
Start: 2024-12-10 | End: 2024-12-17

## 2024-12-10 RX ADMIN — BACLOFEN 5 MG: 5 TABLET ORAL at 09:26

## 2024-12-10 RX ADMIN — CLOPIDOGREL BISULFATE 75 MG: 75 TABLET ORAL at 09:25

## 2024-12-10 RX ADMIN — ASPIRIN 81 MG: 81 TABLET, CHEWABLE ORAL at 09:26

## 2024-12-10 RX ADMIN — ACETAMINOPHEN 650 MG: 325 TABLET ORAL at 06:05

## 2024-12-10 RX ADMIN — BACLOFEN 5 MG: 5 TABLET ORAL at 13:39

## 2024-12-10 RX ADMIN — ENOXAPARIN SODIUM 40 MG: 100 INJECTION SUBCUTANEOUS at 09:26

## 2024-12-10 RX ADMIN — SODIUM CHLORIDE, PRESERVATIVE FREE 10 ML: 5 INJECTION INTRAVENOUS at 09:41

## 2024-12-10 RX ADMIN — ACETAMINOPHEN 650 MG: 325 TABLET ORAL at 11:56

## 2024-12-10 RX ADMIN — ACETAMINOPHEN 650 MG: 325 TABLET ORAL at 00:44

## 2024-12-10 ASSESSMENT — PAIN SCALES - WONG BAKER: WONGBAKER_NUMERICALRESPONSE: HURTS A LITTLE BIT

## 2024-12-10 ASSESSMENT — PAIN SCALES - GENERAL
PAINLEVEL_OUTOF10: 4
PAINLEVEL_OUTOF10: 5
PAINLEVEL_OUTOF10: 0

## 2024-12-10 ASSESSMENT — PAIN - FUNCTIONAL ASSESSMENT: PAIN_FUNCTIONAL_ASSESSMENT: PREVENTS OR INTERFERES SOME ACTIVE ACTIVITIES AND ADLS

## 2024-12-10 ASSESSMENT — PAIN DESCRIPTION - LOCATION: LOCATION: SHOULDER;LEG

## 2024-12-10 ASSESSMENT — PAIN DESCRIPTION - DESCRIPTORS: DESCRIPTORS: ACHING

## 2024-12-10 ASSESSMENT — PAIN DESCRIPTION - ORIENTATION: ORIENTATION: RIGHT

## 2024-12-10 NOTE — CARE COORDINATION
Patient discharging today, going to [facility] today, bed available after 5pm/1700. Going to room 2136    Nurse to call report at [phone] or [phone]    Transition of Care Plan:    RUR: 14%  Prior Level of Functioning: ind  Disposition: IRF  JOHNSON: today  If SNF or IPR: Date FOC offered: 12/10  Date FOC received: 12/10  Accepting facility: [facility]  Date authorization started with reference number: na  Date authorization received and expires: na  Follow up appointments:   DME needed: na  Transportation at discharge: hospital to home  IM/IMM Medicare/ letter given: 12/08  Is patient a  and connected with VA? na  If yes, was  transfer form completed and VA notified? na  Caregiver Contact: at bedside  Discharge Caregiver contacted prior to discharge? At bedside  Care Conference needed? na  Barriers to discharge:  na

## 2024-12-10 NOTE — DISCHARGE SUMMARY
Discharge Summary    Name: Chip Daugherty  [id number]  YOB: 1950 (Age: 74 y.o.)   Date of Admission: 12/7/2024  Date of Discharge: 12/10/2024  Attending Physician: Osito Rodriges MD    Discharge Diagnosis:   Acute left DEANDRE infarct   NSTEMI type II   Leukocytosis, reactive, resolved  History of MI in 2013 status post stent placement  Hyperlipidemia    Consultations:  IP CONSULT TO TELE-NEUROLOGY  IP CONSULT TO NEUROLOGY  IP CONSULT TO TELE-NEUROLOGY    Brief Hospital Course by Main Problems:   Chip Daugherty is a 74 year old man with h/o MI in 2013 s/p stent, HTN, hyperlipidemia who was found on the ground by his wife unable to speak or move R extremities. He was evaluated by the teleneurologist in the ED and diagnosed with an acute ischemic stroke. He received TNK at ~1PM. There was transient improvement in his aphasia but then regression. Repeat CT head did not show any interval changes. He was admitted to the ICU for post-TNK care.  Subsequently, MRI showed acute left DEANDRE territory infarct.  Patient's aphasia and right upper extremity strength moderately improved, without improvement in right lower extremity strength.  Evaluated by teleneurologist, recommended 30 days DAPT, followed by Plavix daily. In addition, patient also reports right shoulder pain secondary to fall prior to, x-ray showing no fracture..  Associated with hematoma.  Will discharge with lidocaine patch, ice compression recommended.     Discharge Exam:  Patient seen and examined by me on discharge day. No new neurological symptoms     General: Alert, no distress  Head & neck: Normocephalic, without obvious abnormality, atraumatic. Neck supple, symmetrical, trachea midline.   Eyes: Conjunctivae/corneas clear. PERRL, EOMs intact.  Oral: Lips, mucosa, and tongue normal  Lungs: Clear to auscultation bilaterally.   Heart: Regular rate and rhythm, S1, S2 normal, no murmur, click, rub or 
input(s): \"CKTOTAL\", \"CKMB\", \"TROPONINI\" in the last 72 hours.  Lab Results   Component Value Date/Time    POCGLU 185 12/09/2024 12:15 PM    POCGLU 135 12/08/2024 07:08 PM    POCGLU 104 12/08/2024 11:45 AM    POCGLU 118 12/07/2024 08:46 PM         Discharge time spent 35 minutes    Signed:  Cb Bearden  12/9/2024  1:07 PM

## 2024-12-10 NOTE — CARE COORDINATION
CM reviewed chart. Recommendations for High intensity and comprehensive skilled physical therapy in a multidisciplinary setting as patient is working towards tolerating up to 3 hours of therapy/day x 5-7 days/week. Patient would like referral sent to [facility] for his rehab. Referral sent via [facility] for review.

## 2024-12-10 NOTE — PLAN OF CARE
Problem: Chronic Conditions and Co-morbidities  Goal: Patient's chronic conditions and co-morbidity symptoms are monitored and maintained or improved  Outcome: Progressing     Problem: Discharge Planning  Goal: Discharge to home or other facility with appropriate resources  Outcome: Progressing     Problem: Safety - Adult  Goal: Free from fall injury  12/9/2024 0854 by Kimberley Figueroa RN  Outcome: Progressing  12/8/2024 2205 by Adin Goel RN  Outcome: Progressing     Problem: Skin/Tissue Integrity  Goal: Absence of new skin breakdown  Description: 1.  Monitor for areas of redness and/or skin breakdown  2.  Assess vascular access sites hourly  3.  Every 4-6 hours minimum:  Change oxygen saturation probe site  4.  Every 4-6 hours:  If on nasal continuous positive airway pressure, respiratory therapy assess nares and determine need for appliance change or resting period.  Outcome: Progressing     Problem: Neurosensory - Adult  Goal: Achieves stable or improved neurological status  Outcome: Progressing     Problem: Cardiovascular - Adult  Goal: Maintains optimal cardiac output and hemodynamic stability  12/9/2024 0854 by Kimberely Figueroa RN  Outcome: Progressing  12/8/2024 2205 by Adin Goel RN  Outcome: Progressing     Problem: Skin/Tissue Integrity - Adult  Goal: Incisions, wounds, or drain sites healing without S/S of infection  12/9/2024 0854 by Kimberley Figueroa RN  Outcome: Progressing  12/8/2024 2205 by Adin Goel RN  Outcome: Progressing     Problem: Musculoskeletal - Adult  Goal: Return mobility to safest level of function  Outcome: Progressing     Problem: Pain  Goal: Verbalizes/displays adequate comfort level or baseline comfort level  Outcome: Progressing     
  Problem: Chronic Conditions and Co-morbidities  Goal: Patient's chronic conditions and co-morbidity symptoms are monitored and maintained or improved  Outcome: Progressing  Flowsheets (Taken 12/9/2024 2205 by Mai Newton RN)  Care Plan - Patient's Chronic Conditions and Co-Morbidity Symptoms are Monitored and Maintained or Improved: Monitor and assess patient's chronic conditions and comorbid symptoms for stability, deterioration, or improvement     Problem: Safety - Adult  Goal: Free from fall injury  Outcome: Progressing  Flowsheets (Taken 12/10/2024 1206)  Free From Fall Injury:   Instruct family/caregiver on patient safety   Based on caregiver fall risk screen, instruct family/caregiver to ask for assistance with transferring infant if caregiver noted to have fall risk factors     Problem: Discharge Planning  Goal: Discharge to home or other facility with appropriate resources  Flowsheets (Taken 12/10/2024 1206)  Discharge to home or other facility with appropriate resources:   Identify barriers to discharge with patient and caregiver   Arrange for needed discharge resources and transportation as appropriate     
  Problem: Discharge Planning  Goal: Discharge to home or other facility with appropriate resources  12/9/2024 1946 by Mai Newton RN  Outcome: Progressing  12/9/2024 0854 by Kimberley Figueroa RN  Outcome: Progressing     Problem: Safety - Adult  Goal: Free from fall injury  12/9/2024 1946 by Mai Newton RN  Outcome: Progressing  12/9/2024 0854 by Kimberley Figueroa RN  Outcome: Progressing     Problem: Skin/Tissue Integrity  Goal: Absence of new skin breakdown  Description: 1.  Monitor for areas of redness and/or skin breakdown  2.  Assess vascular access sites hourly  3.  Every 4-6 hours minimum:  Change oxygen saturation probe site  4.  Every 4-6 hours:  If on nasal continuous positive airway pressure, respiratory therapy assess nares and determine need for appliance change or resting period.  12/9/2024 1946 by Mai Newton RN  Outcome: Progressing     Problem: Neurosensory - Adult  Goal: Achieves stable or improved neurological status  12/9/2024 1946 by Mai Newton RN  Outcome: Progressing  12/9/2024 0854 by Kimberley Figueroa RN  Outcome: Progressing     Problem: Cardiovascular - Adult  Goal: Maintains optimal cardiac output and hemodynamic stability  12/9/2024 0854 by Kimberley Figueroa RN  Outcome: Progressing     Problem: Skin/Tissue Integrity - Adult  Goal: Incisions, wounds, or drain sites healing without S/S of infection  12/9/2024 1946 by Mai Newton RN  Outcome: Progressing  12/9/2024 0854 by Kimberley Figueroa RN  Outcome: Progressing     Problem: Pain  Goal: Verbalizes/displays adequate comfort level or baseline comfort level  12/9/2024 0854 by Kimberley Figueroa RN  Outcome: Progressing     
OCCUPATIONAL THERAPY EVALUATION  Patient: Chip Daugherty (74 y.o. male)  Date: 12/9/2024  Primary Diagnosis: Colitis [K52.9]  Elevated troponin [R79.89]  Stroke determined by clinical assessment ([facility]) [I63.9]  Cerebrovascular accident (CVA), unspecified mechanism ([facility]) [I63.9]       Precautions: Ax2, Bed Alarm, Fall Risk                Recommendations for nursing mobility: Out of bed to chair for meals, Encourage HEP in prep for ADLs/mobility; see handout for details, Frequent repositioning to prevent skin breakdown, Use of bed/chair alarm for safety, LE elevation for management of edema, Use of BSC for toileting , AD and gt belt for bed to chair , Alexa Stedy for bed to chair transfers , and Assist x2    In place during session:External Catheter, EKG/telemetry , Pulse ox, and continuous ICU monitoring  ASSESSMENT  Pt is a 74 y.o. male presenting to [facility] with c/o being found on the ground by his wife with difficulty speaking and moving RUE/RLE, admitted 12/7/24 and currently being treated for acute ischemic stroke, elevated troponin. Brain MRI 12/8 shows acute vs subacute L DEANDRE territory infarct. Cervical, thoracic, and lumbar spine CT 12/7/24 show no acute fracture or subluxation. Pt received semi-supine in bed upon arrival, AXO x 4, and agreeable to OT evaluation.     Based on current observations, pt presents with decreased  functional mobility, independence in ADLs, high-level IADLs, ROM, strength, body mechanics, activity tolerance, coordination, balance, proprioception, posture, fine-motor control, increased pain levels (see below for objective details and assist levels).     Overall, pt tolerates session fair with c/o 7/10 RUE pain. Bed mobility completed with min A for rolling, mod Ax2 for supine>sit, and mod A for scooting. Pt demo's decreased RUE strength/AROM and FM/GM coordination compared to LUE, however does appear functional with additional time for tasks. Max A req'd for LB dressing d/t pt being able 
within 7 day(s).      -Patient will participate in modified barium swallow study within 7 day(s).      -Patient will demonstrate understanding of swallow safety precautions and aspiration precautions, diet recs with min cues within 7 day(s).          Outcome: Progressing        PLAN :  Recommendations and Planned Interventions:  DIET RECOMMENDATIONS: Regular and thin liquids    SWALLOW SAFETY PRECAUTIONS: Upright positioning during po intake and after po intake for at least 30-60 minutes, slow rate, small-single sips and bites     Acute SLP Services: Yes, patient will be followed by speech-language pathology 1x/week to address goals. Patient's rehabilitation potential is considered to be Good.    Discharge Recommendations: To Be Determined     SUBJECTIVE:   IM good    OBJECTIVE:     Past Medical History:   Diagnosis Date    Heart attack (HCC)     High cholesterol     Hypertension    History reviewed. No pertinent surgical history.  Prior Level of Function/Home Situation:         Start of Session End of Session   Heart Rate 81 88   SPO2 97 95       Baseline Assessment:  Current Diet : Regular  Current Liquid Diet : Thin  Prior Dysphagia History: denies       General:   Comments: CVA with TNK  Subjective: Im good  O2 Device: None (Room air)     Current Diet : Regular  Current Liquid Diet : Thin  Dentition: Adequate  Patient Positioning: Upright in bed  Prior Dysphagia History: denies       Cognitive and Communication Status:  Neurologic State: Alert  Orientation Level: Oriented x4  Cognition: Appropriate decision making, Appropriate for age attention/concentration, and Appropriate safety awareness    Dysphagia:  Oral Assessment:  Oral Motor   Labial: No impairment  Dentition: Natural  Oral Hygiene: Moist  Lingual: Right deviation  Velum: Unable to visualize  Mandible: No impairment    Voice:  WFL    After Treatment:  Patient left in no apparent distress in bed and Nursing notified     Pain:  VAS (numerical) 
sessions occurred together for increased safety of pt and clinician.     Wanda Culver, PT  Minutes: 45

## 2024-12-10 NOTE — DISCHARGE INSTR - COC
Continuity of Care Form    Patient Name: Chip Daugherty   :  1950  MRN:  [id number]    Admit date:  2024  Discharge date:  12/10/2024    Code Status Order: Full Code   Advance Directives:   Advance Care Flowsheet Documentation             Admitting Physician:  Gordon Novak MD  PCP: Uvaldo Christianson, APRN - NP    Discharging Nurse: Ada Pittman RN  Discharging Hospital Unit/Room#: 568/01  Discharging Unit Phone Number: [phone]    Emergency Contact:   Extended Emergency Contact Information  Primary Emergency Contact: Saba Daugherty  Home Phone: [phone]  Mobile Phone: [phone]  Relation: Spouse  Preferred language: English   needed? No    Past Surgical History:  History reviewed. No pertinent surgical history.    Immunization History:     There is no immunization history on file for this patient.    Active Problems:  Patient Active Problem List   Diagnosis Code    Stroke determined by clinical assessment ([facility]) I63.9       Isolation/Infection:   Isolation            No Isolation          Patient Infection Status       None to display            Nurse Assessment:  Last Vital Signs: BP (!) 127/100   Pulse (!) 109   Temp 99 °F (37.2 °C) (Oral)   Resp 17   Ht 1.702 m (5' 7.01\")   Wt 86.9 kg (191 lb 9.3 oz)   SpO2 96%   BMI 30.00 kg/m²     Last documented pain score (0-10 scale): Pain Level: 4  Last Weight:   Wt Readings from Last 1 Encounters:   24 86.9 kg (191 lb 9.3 oz)     Mental Status:  alert, coherent, logical, thought processes intact, and able to concentrate and follow conversation    IV Access:  - None    Nursing Mobility/ADLs:  Walking   Dependent  Transfer  Dependent  Bathing  Assisted  Dressing  Assisted  Toileting  Assisted  Feeding  Independent  Med Admin  Independent  Med Delivery   whole    Wound Care Documentation and Therapy:        Elimination:  Continence:   Bowel: Yes  Bladder: Yes  Urinary Catheter: None

## 2024-12-11 LAB
BACTERIA SPEC CULT: NORMAL
Lab: NORMAL

## 2024-12-12 NOTE — PROGRESS NOTES
Hospitalist Progress Note               Daily Progress Note: 12/9/2024      Hospital Day: 3     Chief complaint:   Chief Complaint   Patient presents with    Cerebrovascular Accident        Subjective:   Hospital course to date:  Chip Daugherty is a 74 year old man with h/o MI in 2013 s/p stent, HTN, hyperlipidemia who was found on the ground by his wife unable to speak or move R extremities. He was evaluated by the teleneurologist in the ED and diagnosed with an acute ischemic stroke. He received TNK at ~1PM. There was transient improvement in his aphasia but then regression. Repeat CT head did not show any interval changes. He was admitted to the ICU for post-TNK care.     12/8  Patient's aphasia and right upper extremity strength moderately improved, without improvement in right lower extremity strength.  CT head without contrast showed acute versus subacute left DEANDRE territory infarct with no acute intracranial hemorrhage.  Neurology saw and recommends echocardiogram with bubble study.  Speech pathology recommends regular and thin liquids.    --------  Patient is seen today for follow-up and presents in a good mood. PTOT had just seen him and gotten him into his chair. They are going to recommend the patient to go to a SNF upon discharge. Patient reports moderate improvements in his RUE strength and aphasia. However, he is still unable to move his RLE.   He is requesting to go to [facility].     Medications reviewed  Current Facility-Administered Medications   Medication Dose Route Frequency    aspirin chewable tablet 81 mg  81 mg Oral Daily    bisacodyl (DULCOLAX) suppository 10 mg  10 mg Rectal Daily PRN    traZODone (DESYREL) tablet 50 mg  50 mg Oral Nightly    traMADol (ULTRAM) tablet 50 mg  50 mg Oral Q6H PRN    clopidogrel (PLAVIX) tablet 75 mg  75 mg Oral Daily    atorvastatin (LIPITOR) tablet 10 mg  10 mg Oral Nightly    enoxaparin (LOVENOX) injection 40 mg  40 mg SubCUTAneous Daily    sodium 
  Physician Progress Note      PATIENT:               ERENDIRA LEE  [facility] #:                  [id number]  :                       1950  ADMIT DATE:       2024 12:04 PM  DISCH DATE:        12/10/2024 5:08 PM  RESPONDING  PROVIDER #:        Osito HALL Cha, MD          QUERY TEXT:    Patient admitted with Acute left DEANDRE infarct.   Noted documentation of acute   myocardial injury without ischemia in  HP and NSTEMI type II in    Attending note and 12/10 DC summary.  If possible, please document in progress notes and discharge summary if you   are evaluating and /or treating any of the following:    The medical record reflects the following:  Risk Factors: 74 year old male, h/o MI in  s/p stent, HTN, hyperlipidemia  Clinical Indicators:  HP - Elevated troponin  Troponin 1560 on admission. EKG without ST changes. No ischemic symptoms   reported. CTA chest without aortic pathology. Troponin down to 1301 on   recheck. Likely acute myocardial injury without ischemia/infarction in setting   of stress from stroke.   Attending PN - NSTEMI type II  -On admission patient had troponin 1560 trended down to 1301  -EKG was without ST changes patient did not report chest pain  -CTA chest did not show any aortic pathology  Treatment: Aspirin, Plavix, Lipitor      Please email [email or web address] with any questions  Options provided:  -- acute myocardial injury without ischemia confirmed and NSTEMI type II ruled   out  -- NSTEMI type II confirmed and acute myocardial injury without ischemia ruled   out  -- Other - I will add my own diagnosis  -- Disagree - Not applicable / Not valid  -- Disagree - Clinically unable to determine / Unknown  -- Refer to Clinical Documentation Reviewer    PROVIDER RESPONSE TEXT:    After study, NSTEMI type II confirmed and acute myocardial injury without   ischemia ruled out.    Query created by: Elva Peralta on 2024 7:45 AM      Electronically signed by:  Osito HALL 
CRITICAL CARE PROGRESS NOTE    Name: Chip Daugherty   : 1950   MRN: [id number]   Date: 2024      Diagnoses/problem list:   Acute ischemic stroke  Myocardial injury    24-hour events:   - Still without RLE motor strength  - Ongoing tremors/rigidity in RLE    ROS negative except as otherwise documented.     Assessment and plan:     NEUROLOGICAL:    Acute L DEANDRE infarct causing aphasia and RUE & RLL extremity weakness s/p TNK at 1PM on . 24 hour head imaging without intracranial bleed.   - Improved aphasia and RUE strength on   - RLE still lacking motor movement  - Target BP <185/110  - Follow up TTE  - Appreciate Teleneurology recs    -Clopidogrel 75 mg daily   -ASA 81 mg daily    -Atorvastatin 80 mg daily   - PT eval  - Start baclofen per Neurology for RLE rigidity/tremors which can be a post acute stroke phenomenon which will likely resolve within 3 months    PULMONOLOGY:   On room air.      CARDIOVASCULAR:   H/o MI in  s/p stent.  Elevated troponin  Troponin 1560 on admission. EKG without ST changes. No ischemic symptoms reported. CTA chest without aortic pathology. Troponin down to 1301 on recheck. Likely acute myocardial injury without ischemia/infarction in setting of stress from stroke.  - EKG prn     GASTROINTESTINAL:   Nutrition: regular  GI Px: not indicated           RENAL/ELECTROLYTE:   Creatinine normal at 1.29  - Trend BMP, UOP       ENDOCRINE:   No active issues.      HEMATOLOGY/ONCOLOGY:   No acute concerns  -Goal Hb>=7  - Lovenox for VTE prophy     ID/MICRO:   Mild leukocytosis  Likely stress from stroke. No obvious infection on history, exam, imaging. Skeptical of mild R-sided colitis on CT given lack of correlating symptoms.  - Trend WBC count, temperature    ICU DAILY CHECKLIST     Code Status: Full  DVT Prophylaxis: lovenox prophy   T/L/D: PIV  SUP: not indicated  Diet: regular  Activity Level: as tolerated  Disposition: transfer to non-ICU bed      OBJECTIVE:     Blood 
OCCUPATIONAL THERAPY TREATMENT  Patient: Chip Daugherty (74 y.o. male)  Date: 12/10/2024  Primary Diagnosis: Colitis [K52.9]  Elevated troponin [R79.89]  Stroke determined by clinical assessment (HCC) [I63.9]  Cerebrovascular accident (CVA), unspecified mechanism (HCC) [I63.9]       Precautions: Bed Alarm, Fall Risk                Recommendations for nursing mobility: Out of bed to chair for meals, Encourage HEP in prep for ADLs/mobility; see handout for details, Use of BSC for toileting , AD and gt belt for bed to chair , and Assist x2    In place during session: External Catheter and EKG/telemetry   Chart, occupational therapy assessment, plan of care, and goals were reviewed.  ASSESSMENT  Patient continues with skilled OT services and is progressing towards goals. Pt seated in recliner upon CARRASCO and PT arrival, agreeable to session. Pt A&O x 4. Pt completed sts x 4 w/ mod A x 2.First trial was just standing by recliner, bed brought closer while pt rested.  Pt able to take several side steps to eob on second attempt w/ vc's to off load weight on R side and occ'l a to move R LE. On 3 and 4th standing pt took side steps to end of bed and hob in preparation for functional t/f's and household mobility.  See PT note for details. PT completed 10 hand taps obs w/ good accuracy. Reviewed energy conservation and HEP w/ pt verbalizing good understanding. HR monitored through out session and ranged 103  - 149 w/ activity.  Pt required frequent rest breaks during session. Pt left semi supine in bed with all known needs met. (See below for objective details and assist levels).     Overall pt tolerated session well today with rest breaks.Pt pleasant and appears very motivated to increase functional level to return to PLOF. Current OT recommendations for discharge High intensity and comprehensive skilled occupational therapy in a multidisciplinary setting as patient is working towards tolerating up to 3 hours of therapy/day x 
PT eval order received and acknowledged. PT eval attempted at 1330 however pt off the floor in CT. Will continue to follow patient and attempt PT eval at a later time. Thank you.    
Patient sees [facility] for cardiology! Per patient   
Received Order for Telemetry     Chip Daugherty   1950   [id number]   Colitis [K52.9]  Elevated troponin [R79.89]  Stroke determined by clinical assessment (HCC) [I63.9]  Cerebrovascular accident (CVA), unspecified mechanism (HCC) [I63.9]   Dale Leiva MD     Tele Box # [id number] placed on patient at  16:30 pm  ICU Room # 281  Admitting to Room 568  Verified with Primary Nurse *** at {Time:[id number]}    
TRANSFER - OUT REPORT:    Verbal report given to CASSANDRA Caballero on Chip Daugherty  being transferred to [facility] for routine progression of patient care       Report consisted of patient's Situation, Background, Assessment and   Recommendations(SBAR).     Information from the following report(s) Nurse Handoff Report, ED SBAR, MAR, and Cardiac Rhythm NSR with PVCs  was reviewed with the receiving nurse.           Lines:   Peripheral IV 12/07/24 Distal;Left Cephalic (Active)   Site Assessment Clean, dry & intact 12/09/24 1100   Line Status Flushed;No blood return;Capped 12/09/24 1100   Line Care Connections checked and tightened;Cap changed 12/09/24 1100   Phlebitis Assessment No symptoms 12/09/24 1100   Infiltration Assessment 0 12/09/24 1100   Alcohol Cap Used Yes 12/09/24 1100   Dressing Status Clean, dry & intact 12/09/24 1100   Dressing Type Transparent 12/09/24 1100       Peripheral IV 12/07/24 Left;Posterior Hand (Active)   Site Assessment Clean, dry & intact 12/09/24 1100   Line Status Flushed;Capped 12/09/24 1100   Line Care Connections checked and tightened 12/09/24 1100   Phlebitis Assessment No symptoms 12/09/24 1100   Infiltration Assessment 0 12/09/24 1100   Alcohol Cap Used Yes 12/09/24 1100   Dressing Status Clean, dry & intact 12/09/24 1100   Dressing Type Transparent 12/09/24 1100   Dressing Intervention New 12/08/24 0300        Opportunity for questions and clarification was provided.      Patient transported with:  Monitor and Registered Nurse        
billed separately.    Heron Novak MD  Critical Care Medicine  [facility] Critical [facility]    
Independent household ambulator, with or without device, Independent community ambulator, with or without device  Prior Level of Assist for Transfers: Independent  Occupation: Retired    Cognitive/Behavioral Status:  Orientation  Orientation Level: Oriented X4  Cognition  Overall Cognitive Status: WNL    Skin: intact where visible, bruising noted right triceps area    Edema: none noted    Hearing:   Hearing  Hearing: Within functional limits    Vision/Perceptual:    Vision - Basic Assessment  Patient Visual Report: No visual complaint reported.  Oculo Motor Control: WNL     Vision  Vision: Within Functional Limits  Perception  Unilateral Attention: Appears intact  Initiation: Appears intact  Motor Planning: Appears intact  Perseveration: Not present    Strength:    Strength:  (WFL left LE, grossly limited and nonfunctional right LE)    Range Of Motion:  AROM:  (WFL left LE, grossly limited and nonfunctional right LE)  PROM:  (WFL left LE, grossly limited and nonfunctional right ankle)    Coordination:  Coordination:  (WFL left LE, grossly limited and nonfunctional right LE)     Tone & Sensation:   Tone: Abnormal (hypertonic right ankle, sustained clonus noted right foot with DF)  Sensation: Impaired (right LE intact light touch but reports inability to feel floor under foot in seated and standing)    Functional Mobility:  Bed Mobility:     Bed Mobility Training  Bed Mobility Training: Yes  Interventions: Verbal cues  Rolling: Minimum assistance  Supine to Sit: Moderate assistance;Assist X2;Additional time  Scooting: Moderate assistance;Additional time  Transfers:     Transfer Training  Transfer Training: Yes  Interventions: Verbal cues;Safety awareness training;Visual cues  Sit to Stand: Moderate assistance;Assist X2;Additional time;Adaptive equipment  Stand to Sit: Moderate assistance;Assist X2;Additional time;Adaptive equipment  Stand Pivot Transfers: Moderate assistance;Maximum assistance;Assist X2;Additional 
the assistance of a trained virtual health liaison working at the originating site.? The consultation used real time licensed and encrypted telehealth equipment which allowed a live video connection between my location and the patient's location.? Aspects of the evaluation that could not be adequately evaluated by virtual assessment were shared with both the patient and the consulting provider.?          A total of 45 minutes of time was spent in direct care and coordination of care with the patient.

## 2024-12-13 LAB
BACTERIA SPEC CULT: NORMAL
BACTERIA SPEC CULT: NORMAL
Lab: NORMAL
Lab: NORMAL

## 2024-12-17 ENCOUNTER — TELEPHONE (OUTPATIENT)
Age: 74
End: 2024-12-17

## 2024-12-17 NOTE — TELEPHONE ENCOUNTER
Reached out to the patient to schedule a hospital follow up for Stroke. Patient stated he is currently in [facility] until January 1st, 2025. Informed the patient to reach back out if the appointment is still needed after he is released.

## 2025-07-30 ENCOUNTER — HOSPITAL ENCOUNTER (OUTPATIENT)
Facility: HOSPITAL | Age: 75
Setting detail: RECURRING SERIES
Discharge: HOME | End: 2025-08-02
Payer: MEDICARE

## 2025-07-30 PROCEDURE — 97161 PT EVAL LOW COMPLEX 20 MIN: CPT

## 2025-07-30 PROCEDURE — 97110 THERAPEUTIC EXERCISES: CPT

## 2025-07-30 NOTE — THERAPY EVALUATION
[facility]  [address], [address]  [place] [address]  Phone: [phone]    Fax: [phone]         PHYSICAL THERAPY - MEDICARE EVALUATION/PLAN OF CARE NOTE (updated 3/23)      Date: 2025          Patient Name:  Chip Daugherty :  1950   Medical   Diagnosis:  Hemiplegia, unspecified affecting right dominant side (HCC) [G81.91] Treatment Diagnosis:  R26.89   Abnormalities of gait and mobility    Referral Source:  Uvaldo Christianson, APR* Provider #:  [id number]                Insurance: Payor: MEDICARE / Plan: MEDICARE PART A AND B / Product Type: *No Product type* /      Patient  verified yes     Visit #   Current  / Total 1 24   Time   In / Out 100 200   Total Treatment Time 60   Total Timed Codes 15   1:1 Treatment Time 60      [facility] Totals Reminder:  bill using total billable   min of TIMED therapeutic procedures and modalities.   8-22 min = 1 unit; 23-37 min = 2 units; 38-52 min = 3 units;  53-67 min = 4 units; 68-82 min = 5 units           SUBJECTIVE  If an interpreting service was utilized for treatment of this patient, the contents of this document represent the material reviewed with the patient via the .     Pain Level (0-10 scale): 0-6  []constant []intermittent []improving []worsening []no change since onset    Any medication changes, allergies to medications, adverse drug reactions, diagnosis change, or new procedure performed?: [x] No    [] Yes (see summary sheet for update)  Medications: Verified on Patient Summary List    Subjective functional status/changes:     24 stroke    Start of Care: 2025  Onset Date: 24  Current symptoms/Complaints: so stiff all over; need AFO; sore after being on car trip  Mechanism of Injury: stroke  PLOF: wfl  Limitations to PLOF/Activity or Recreational Limitations: na  Work Hx: retired [employer]  Living Situation: spouse  Mobility: limited walking speed, endurance,

## 2025-08-05 ENCOUNTER — HOSPITAL ENCOUNTER (OUTPATIENT)
Facility: HOSPITAL | Age: 75
Setting detail: RECURRING SERIES
Discharge: HOME | End: 2025-08-08
Payer: MEDICARE

## 2025-08-05 PROCEDURE — 97110 THERAPEUTIC EXERCISES: CPT

## 2025-08-07 ENCOUNTER — HOSPITAL ENCOUNTER (OUTPATIENT)
Facility: HOSPITAL | Age: 75
Setting detail: RECURRING SERIES
Discharge: HOME | End: 2025-08-10
Payer: MEDICARE

## 2025-08-07 PROCEDURE — 97110 THERAPEUTIC EXERCISES: CPT

## 2025-08-11 ENCOUNTER — HOSPITAL ENCOUNTER (OUTPATIENT)
Facility: HOSPITAL | Age: 75
Setting detail: RECURRING SERIES
Discharge: HOME | End: 2025-08-14
Payer: MEDICARE

## 2025-08-11 PROCEDURE — 97110 THERAPEUTIC EXERCISES: CPT

## 2025-08-13 ENCOUNTER — HOSPITAL ENCOUNTER (OUTPATIENT)
Facility: HOSPITAL | Age: 75
Setting detail: RECURRING SERIES
Discharge: HOME | End: 2025-08-16
Payer: MEDICARE

## 2025-08-13 PROCEDURE — 97140 MANUAL THERAPY 1/> REGIONS: CPT

## 2025-08-13 PROCEDURE — 97110 THERAPEUTIC EXERCISES: CPT

## 2025-08-18 ENCOUNTER — HOSPITAL ENCOUNTER (OUTPATIENT)
Facility: HOSPITAL | Age: 75
Setting detail: RECURRING SERIES
Discharge: HOME | End: 2025-08-21
Payer: MEDICARE

## 2025-08-18 PROCEDURE — 97110 THERAPEUTIC EXERCISES: CPT

## 2025-08-20 ENCOUNTER — HOSPITAL ENCOUNTER (OUTPATIENT)
Facility: HOSPITAL | Age: 75
Setting detail: RECURRING SERIES
Discharge: HOME | End: 2025-08-23
Payer: MEDICARE

## 2025-08-20 PROCEDURE — 97110 THERAPEUTIC EXERCISES: CPT

## 2025-08-26 ENCOUNTER — APPOINTMENT (OUTPATIENT)
Facility: HOSPITAL | Age: 75
End: 2025-08-26
Payer: MEDICARE

## 2025-08-28 ENCOUNTER — HOSPITAL ENCOUNTER (OUTPATIENT)
Facility: HOSPITAL | Age: 75
Setting detail: RECURRING SERIES
Discharge: HOME | End: 2025-08-31
Payer: MEDICARE

## 2025-08-28 PROCEDURE — 97110 THERAPEUTIC EXERCISES: CPT

## 2025-09-02 ENCOUNTER — HOSPITAL ENCOUNTER (OUTPATIENT)
Facility: HOSPITAL | Age: 75
Setting detail: RECURRING SERIES
Discharge: HOME | End: 2025-09-05
Payer: MEDICARE

## 2025-09-02 PROCEDURE — 97110 THERAPEUTIC EXERCISES: CPT
